# Patient Record
Sex: FEMALE | Race: OTHER | HISPANIC OR LATINO | URBAN - METROPOLITAN AREA
[De-identification: names, ages, dates, MRNs, and addresses within clinical notes are randomized per-mention and may not be internally consistent; named-entity substitution may affect disease eponyms.]

---

## 2020-07-06 ENCOUNTER — OFFICE VISIT (OUTPATIENT)
Dept: URBAN - METROPOLITAN AREA CLINIC 63 | Facility: CLINIC | Age: 85
End: 2020-07-06

## 2020-08-03 ENCOUNTER — OFFICE VISIT (OUTPATIENT)
Dept: URBAN - METROPOLITAN AREA CLINIC 60 | Facility: CLINIC | Age: 85
End: 2020-08-03

## 2022-03-30 ENCOUNTER — OFFICE VISIT (OUTPATIENT)
Dept: URBAN - METROPOLITAN AREA CLINIC 60 | Facility: CLINIC | Age: 87
End: 2022-03-30

## 2022-07-09 ENCOUNTER — TELEPHONE ENCOUNTER (OUTPATIENT)
Dept: URBAN - METROPOLITAN AREA CLINIC 121 | Facility: CLINIC | Age: 87
End: 2022-07-09

## 2022-07-09 RX ORDER — SUCRALFATE 1 G/1
TWICE A DAY TABLET ORAL TWICE A DAY
Refills: 0 | OUTPATIENT
Start: 2017-06-28 | End: 2017-07-12

## 2022-07-09 RX ORDER — IRBESARTAN/HYDROCHLOROTHIAZIDE 150-12.5MG
TABLET ORAL
Refills: 0 | OUTPATIENT
Start: 2018-02-05 | End: 2018-04-16

## 2022-07-09 RX ORDER — SERTRALINE 50 MG/1
TABLET, FILM COATED ORAL
Refills: 0 | OUTPATIENT
Start: 2019-11-21 | End: 2020-07-06

## 2022-07-09 RX ORDER — SUCRALFATE 1 G/1
TWICE A DAY TABLET ORAL TWICE A DAY
Refills: 0 | OUTPATIENT
Start: 2017-04-05 | End: 2017-06-28

## 2022-07-09 RX ORDER — ESOMEPRAZOLE MAGNESIUM 40 MG
ONCE A DAY CAPSULE,DELAYED RELEASE (ENTERIC COATED) ORAL ONCE A DAY
Refills: 1 | OUTPATIENT
Start: 2017-04-05 | End: 2017-06-28

## 2022-07-09 RX ORDER — ESOMEPRAZOLE MAGNESIUM 20 MG/1
ONCE A DAY CAPSULE, DELAYED RELEASE ORAL ONCE A DAY
Refills: 0 | OUTPATIENT
Start: 2019-10-15 | End: 2019-12-30

## 2022-07-09 RX ORDER — ESOMEPRAZOLE MAGNESIUM 40 MG/1
USE AS DIRECTED CAPSULE, DELAYED RELEASE ORAL
Refills: 0 | OUTPATIENT
Start: 2018-11-13 | End: 2018-08-29

## 2022-07-09 RX ORDER — DEXLANSOPRAZOLE 60 MG/1
ONCE A DAY CAPSULE, DELAYED RELEASE ORAL ONCE A DAY
Refills: 1 | OUTPATIENT
Start: 2020-07-06 | End: 2020-10-13

## 2022-07-09 RX ORDER — ESOMEPRAZOLE MAGNESIUM 40 MG/1
USE AS DIRECTED CAPSULE, DELAYED RELEASE ORAL
Refills: 0 | OUTPATIENT
Start: 2019-06-05 | End: 2018-08-29

## 2022-07-09 RX ORDER — ROSUVASTATIN CALCIUM 10 MG
TABLET ORAL
Refills: 0 | OUTPATIENT
Start: 2020-02-03 | End: 2020-07-06

## 2022-07-09 RX ORDER — IRBESARTAN/HYDROCHLOROTHIAZIDE 150-12.5MG
TABLET ORAL
Refills: 0 | OUTPATIENT
Start: 2018-04-16 | End: 2019-08-26

## 2022-07-09 RX ORDER — ROSUVASTATIN CALCIUM 10 MG
TABLET ORAL
Refills: 0 | OUTPATIENT
Start: 2018-02-05 | End: 2018-04-16

## 2022-07-09 RX ORDER — FAMOTIDINE 10 MG
ONCE A DAY TABLET ORAL ONCE A DAY
Refills: 1 | OUTPATIENT
Start: 2018-08-29 | End: 2019-02-18

## 2022-07-09 RX ORDER — ESOMEPRAZOLE MAGNESIUM 40 MG
ONCE A DAY CAPSULE,DELAYED RELEASE (ENTERIC COATED) ORAL ONCE A DAY
Refills: 1 | OUTPATIENT
Start: 2017-01-25 | End: 2017-04-05

## 2022-07-09 RX ORDER — RANOLAZINE 1000 MG/1
2 TAB BID TABLET, FILM COATED, EXTENDED RELEASE ORAL
Refills: 0 | OUTPATIENT
Start: 2018-02-05 | End: 2018-04-16

## 2022-07-09 RX ORDER — ISOSORBIDE MONONITRATE 60 MG/1
TABLET, EXTENDED RELEASE ORAL
Refills: 0 | OUTPATIENT
Start: 2018-08-29 | End: 2019-08-26

## 2022-07-09 RX ORDER — FAMOTIDINE 10 MG
TABLET ORAL
Refills: 0 | OUTPATIENT
Start: 2018-08-29 | End: 2018-08-29

## 2022-07-09 RX ORDER — ESOMEPRAZOLE MAGNESIUM 40 MG
CAPSULE,DELAYED RELEASE (ENTERIC COATED) ORAL
Refills: 0 | OUTPATIENT
Start: 2017-06-28 | End: 2017-06-28

## 2022-07-09 RX ORDER — ISOSORBIDE MONONITRATE 60 MG/1
TABLET, EXTENDED RELEASE ORAL
Refills: 0 | OUTPATIENT
Start: 2020-02-03 | End: 2020-07-06

## 2022-07-09 RX ORDER — ESOMEPRAZOLE MAGNESIUM 40 MG/1
ONCE A DAY CAPSULE, DELAYED RELEASE ORAL ONCE A DAY
Refills: 1 | OUTPATIENT
Start: 2017-06-28 | End: 2017-07-12

## 2022-07-09 RX ORDER — ESOMEPRAZOLE MAGNESIUM 40 MG/1
CAPSULE, DELAYED RELEASE ORAL
Refills: 0 | OUTPATIENT
Start: 2018-02-05 | End: 2018-04-16

## 2022-07-09 RX ORDER — ROSUVASTATIN CALCIUM 10 MG
TABLET ORAL
Refills: 0 | OUTPATIENT
Start: 2017-06-28 | End: 2017-07-12

## 2022-07-09 RX ORDER — RANOLAZINE 1000 MG/1
2 TAB BID TABLET, FILM COATED, EXTENDED RELEASE ORAL
Refills: 0 | OUTPATIENT
Start: 2017-07-12 | End: 2018-02-05

## 2022-07-09 RX ORDER — ISOSORBIDE MONONITRATE 60 MG/1
TABLET, EXTENDED RELEASE ORAL
Refills: 0 | OUTPATIENT
Start: 2018-04-16 | End: 2018-08-29

## 2022-07-09 RX ORDER — IRBESARTAN/HYDROCHLOROTHIAZIDE 150-12.5MG
TABLET ORAL
Refills: 0 | OUTPATIENT
Start: 2019-08-26 | End: 2020-02-03

## 2022-07-09 RX ORDER — ROSUVASTATIN CALCIUM 10 MG
TABLET ORAL
Refills: 0 | OUTPATIENT
Start: 2018-04-16 | End: 2018-08-29

## 2022-07-09 RX ORDER — RANOLAZINE 1000 MG/1
2 TAB BID TABLET, FILM COATED, EXTENDED RELEASE ORAL
Refills: 0 | OUTPATIENT
Start: 2017-01-25 | End: 2017-06-28

## 2022-07-09 RX ORDER — RANOLAZINE 1000 MG/1
2 TAB BID TABLET, FILM COATED, EXTENDED RELEASE ORAL
Refills: 0 | OUTPATIENT
Start: 2017-06-28 | End: 2017-07-12

## 2022-07-09 RX ORDER — ISOSORBIDE DINITRATE 30 MG/1
TABLET ORAL
Refills: 0 | OUTPATIENT
Start: 2017-06-28 | End: 2017-07-12

## 2022-07-09 RX ORDER — FAMOTIDINE 10 MG
TABLET ORAL
Refills: 0 | OUTPATIENT
Start: 2020-02-03 | End: 2020-07-06

## 2022-07-09 RX ORDER — ESOMEPRAZOLE MAGNESIUM 40 MG/1
USE AS DIRECTED CAPSULE, DELAYED RELEASE ORAL
Refills: 1 | OUTPATIENT
Start: 2018-08-29 | End: 2019-08-26

## 2022-07-09 RX ORDER — IRBESARTAN/HYDROCHLOROTHIAZIDE 150-12.5MG
TABLET ORAL
Refills: 0 | OUTPATIENT
Start: 2020-02-03 | End: 2020-07-06

## 2022-07-09 RX ORDER — ESOMEPRAZOLE MAGNESIUM 40 MG/1
CAPSULE, DELAYED RELEASE ORAL
Refills: 0 | OUTPATIENT
Start: 2018-05-23 | End: 2018-08-29

## 2022-07-09 RX ORDER — FAMOTIDINE 10 MG
TABLET ORAL
Refills: 0 | OUTPATIENT
Start: 2019-08-26 | End: 2020-02-03

## 2022-07-09 RX ORDER — SUCRALFATE 1 G/1
TWICE A DAY TABLET ORAL TWICE A DAY
Refills: 0 | OUTPATIENT
Start: 2020-07-06 | End: 2021-01-19

## 2022-07-09 RX ORDER — ESOMEPRAZOLE MAGNESIUM 20 MG/1
ONCE A DAY CAPSULE, DELAYED RELEASE ORAL ONCE A DAY
Refills: 3 | OUTPATIENT
Start: 2019-10-11 | End: 2020-02-03

## 2022-07-09 RX ORDER — ISOSORBIDE MONONITRATE 60 MG/1
TABLET, EXTENDED RELEASE ORAL
Refills: 0 | OUTPATIENT
Start: 2018-02-05 | End: 2018-04-16

## 2022-07-09 RX ORDER — ESOMEPRAZOLE MAGNESIUM 40 MG/1
USE AS DIRECTED CAPSULE, DELAYED RELEASE ORAL
Refills: 1 | OUTPATIENT
Start: 2017-07-12 | End: 2018-02-05

## 2022-07-09 RX ORDER — RANOLAZINE 1000 MG/1
2 TAB BID TABLET, FILM COATED, EXTENDED RELEASE ORAL
Refills: 0 | OUTPATIENT
Start: 2019-08-26 | End: 2020-02-03

## 2022-07-09 RX ORDER — RANOLAZINE 1000 MG/1
2 TAB BID TABLET, FILM COATED, EXTENDED RELEASE ORAL
Refills: 0 | OUTPATIENT
Start: 2020-02-03 | End: 2020-07-06

## 2022-07-09 RX ORDER — IRBESARTAN/HYDROCHLOROTHIAZIDE 150-12.5MG
TABLET ORAL
Refills: 0 | OUTPATIENT
Start: 2017-07-12 | End: 2018-02-05

## 2022-07-09 RX ORDER — DEXLANSOPRAZOLE 60 MG/1
TAKE 1 CAPSULE ONCE DAILY CAPSULE, DELAYED RELEASE ORAL
Refills: 1 | OUTPATIENT
Start: 2020-10-13 | End: 2021-01-28

## 2022-07-09 RX ORDER — FAMOTIDINE 10 MG
ONCE A DAY AT BED TIME TABLET ORAL ONCE A DAY
Refills: 0 | OUTPATIENT
Start: 2017-07-17 | End: 2017-08-14

## 2022-07-09 RX ORDER — ISOSORBIDE DINITRATE 30 MG/1
TABLET ORAL
Refills: 0 | OUTPATIENT
Start: 2017-01-25 | End: 2017-06-28

## 2022-07-09 RX ORDER — IRBESARTAN/HYDROCHLOROTHIAZIDE 150-12.5MG
TABLET ORAL
Refills: 0 | OUTPATIENT
Start: 2017-06-28 | End: 2017-07-12

## 2022-07-09 RX ORDER — RALOXIFENE HCL 60 MG
TABLET ORAL
Refills: 0 | OUTPATIENT
Start: 2009-04-13 | End: 2017-01-25

## 2022-07-09 RX ORDER — ESOMEPRAZOLE MAGNESIUM 40 MG/1
ONCE A DAY CAPSULE, DELAYED RELEASE ORAL ONCE A DAY
Refills: 0 | OUTPATIENT
Start: 2018-02-13 | End: 2018-04-16

## 2022-07-09 RX ORDER — ISOSORBIDE DINITRATE 30 MG/1
TABLET ORAL
Refills: 0 | OUTPATIENT
Start: 2017-07-12 | End: 2018-02-05

## 2022-07-09 RX ORDER — RANOLAZINE 1000 MG/1
2 TAB BID TABLET, FILM COATED, EXTENDED RELEASE ORAL
Refills: 0 | OUTPATIENT
Start: 2018-04-16 | End: 2018-08-29

## 2022-07-09 RX ORDER — IRBESARTAN/HYDROCHLOROTHIAZIDE 150-12.5MG
TABLET ORAL
Refills: 0 | OUTPATIENT
Start: 2009-04-13 | End: 2017-01-25

## 2022-07-09 RX ORDER — ISOSORBIDE MONONITRATE 60 MG/1
TABLET, EXTENDED RELEASE ORAL
Refills: 0 | OUTPATIENT
Start: 2019-08-26 | End: 2020-02-03

## 2022-07-09 RX ORDER — ESOMEPRAZOLE MAGNESIUM 40 MG/1
CAPSULE, DELAYED RELEASE ORAL
Refills: 0 | OUTPATIENT
Start: 2017-07-12 | End: 2017-07-12

## 2022-07-09 RX ORDER — ESOMEPRAZOLE MAGNESIUM 40 MG/1
USE AS DIRECTED CAPSULE, DELAYED RELEASE ORAL
Refills: 0 | OUTPATIENT
Start: 2019-05-07 | End: 2018-08-29

## 2022-07-09 RX ORDER — RANOLAZINE 1000 MG/1
2 TAB BID TABLET, FILM COATED, EXTENDED RELEASE ORAL
Refills: 0 | OUTPATIENT
Start: 2018-08-29 | End: 2019-08-26

## 2022-07-09 RX ORDER — ROSUVASTATIN CALCIUM 10 MG
TABLET ORAL
Refills: 0 | OUTPATIENT
Start: 2019-08-26 | End: 2020-02-03

## 2022-07-09 RX ORDER — ESOMEPRAZOLE MAGNESIUM 40 MG/1
USE AS DIRECTED CAPSULE, DELAYED RELEASE ORAL
Refills: 0 | OUTPATIENT
Start: 2019-06-24 | End: 2019-08-26

## 2022-07-09 RX ORDER — ESOMEPRAZOLE MAGNESIUM 40 MG/1
CAPSULE, DELAYED RELEASE ORAL
Refills: 0 | OUTPATIENT
Start: 2018-04-16 | End: 2020-02-03

## 2022-07-09 RX ORDER — ROSUVASTATIN CALCIUM 10 MG
TABLET ORAL
Refills: 0 | OUTPATIENT
Start: 2018-08-29 | End: 2019-08-26

## 2022-07-09 RX ORDER — RALOXIFENE HYDROCHLORIDE 60 MG/1
TABLET, FILM COATED ORAL
Refills: 0 | OUTPATIENT
Start: 2020-01-04 | End: 2020-07-06

## 2022-07-09 RX ORDER — ROSUVASTATIN CALCIUM 10 MG
TABLET ORAL
Refills: 0 | OUTPATIENT
Start: 2017-07-12 | End: 2018-02-05

## 2022-07-09 RX ORDER — ESOMEPRAZOLE MAGNESIUM 40 MG
CAPSULE,DELAYED RELEASE (ENTERIC COATED) ORAL
Refills: 0 | OUTPATIENT
Start: 2009-04-13 | End: 2017-01-25

## 2022-07-09 RX ORDER — ESOMEPRAZOLE MAGNESIUM 40 MG/1
CAPSULE, DELAYED RELEASE ORAL
Refills: 0 | OUTPATIENT
Start: 2018-05-21 | End: 2018-05-23

## 2022-07-09 RX ORDER — ROSUVASTATIN CALCIUM 10 MG
TABLET ORAL
Refills: 0 | OUTPATIENT
Start: 2017-01-25 | End: 2017-06-28

## 2022-07-10 ENCOUNTER — TELEPHONE ENCOUNTER (OUTPATIENT)
Dept: URBAN - METROPOLITAN AREA CLINIC 121 | Facility: CLINIC | Age: 87
End: 2022-07-10

## 2022-07-10 RX ORDER — RALOXIFENE HCL 60 MG
TABLET ORAL
Refills: 0 | Status: ACTIVE | COMMUNITY
Start: 2017-01-25

## 2022-07-10 RX ORDER — ROSUVASTATIN CALCIUM 10 MG
TABLET ORAL
Refills: 0 | Status: ACTIVE | COMMUNITY
Start: 2020-07-06

## 2022-07-10 RX ORDER — DEXLANSOPRAZOLE 60 MG/1
ONCE A DAY CAPSULE, DELAYED RELEASE ORAL ONCE A DAY
Refills: 1 | Status: ACTIVE | COMMUNITY
Start: 2021-01-28

## 2022-07-10 RX ORDER — SUCRALFATE 1 G/1
TWICE A DAY TABLET ORAL TWICE A DAY
Refills: 0 | Status: ACTIVE | COMMUNITY
Start: 2021-01-19

## 2022-07-10 RX ORDER — RALOXIFENE HYDROCHLORIDE 60 MG/1
TABLET, FILM COATED ORAL
Refills: 0 | Status: ACTIVE | COMMUNITY
Start: 2020-07-06

## 2022-07-10 RX ORDER — FAMOTIDINE 10 MG
TAKE ONE CAPSULE BY MOUTH AT BEDTIME TABLET ORAL
Refills: 0 | Status: ACTIVE | COMMUNITY
Start: 2017-08-14

## 2022-07-10 RX ORDER — IRBESARTAN/HYDROCHLOROTHIAZIDE 150-12.5MG
TABLET ORAL
Refills: 0 | Status: ACTIVE | COMMUNITY
Start: 2020-07-06

## 2022-07-10 RX ORDER — SERTRALINE 50 MG/1
TABLET, FILM COATED ORAL
Refills: 0 | Status: ACTIVE | COMMUNITY
Start: 2020-07-06

## 2022-07-10 RX ORDER — ESOMEPRAZOLE MAGNESIUM 20 MG/1
ONCE A DAY CAPSULE, DELAYED RELEASE ORAL ONCE A DAY
Refills: 3 | Status: ACTIVE | COMMUNITY
Start: 2019-08-26

## 2022-07-10 RX ORDER — FAMOTIDINE 10 MG
ONCE A DAY TABLET ORAL ONCE A DAY
Refills: 1 | Status: ACTIVE | COMMUNITY
Start: 2019-02-18

## 2022-07-10 RX ORDER — RANOLAZINE 1000 MG/1
2 TAB BID TABLET, FILM COATED, EXTENDED RELEASE ORAL
Refills: 0 | Status: ACTIVE | COMMUNITY
Start: 2020-07-06

## 2022-07-10 RX ORDER — ESOMEPRAZOLE MAGNESIUM 20 MG/1
TAKE 1 CAPSULE ONCE DAILY CAPSULE, DELAYED RELEASE ORAL
Refills: 0 | Status: ACTIVE | COMMUNITY
Start: 2019-12-30

## 2022-07-10 RX ORDER — ISOSORBIDE MONONITRATE 60 MG/1
TABLET, EXTENDED RELEASE ORAL
Refills: 0 | Status: ACTIVE | COMMUNITY
Start: 2020-07-06

## 2022-07-10 RX ORDER — IRBESARTAN/HYDROCHLOROTHIAZIDE 150-12.5MG
TABLET ORAL
Refills: 0 | Status: ACTIVE | COMMUNITY
Start: 2017-01-25

## 2022-10-28 ENCOUNTER — OFFICE VISIT (OUTPATIENT)
Dept: URBAN - METROPOLITAN AREA CLINIC 60 | Facility: CLINIC | Age: 87
End: 2022-10-28
Payer: MEDICARE

## 2022-10-28 ENCOUNTER — LAB OUTSIDE AN ENCOUNTER (OUTPATIENT)
Dept: URBAN - METROPOLITAN AREA CLINIC 60 | Facility: CLINIC | Age: 87
End: 2022-10-28

## 2022-10-28 VITALS
DIASTOLIC BLOOD PRESSURE: 68 MMHG | WEIGHT: 133 LBS | SYSTOLIC BLOOD PRESSURE: 122 MMHG | TEMPERATURE: 97.3 F | HEIGHT: 55 IN | BODY MASS INDEX: 30.78 KG/M2 | OXYGEN SATURATION: 98 % | HEART RATE: 71 BPM

## 2022-10-28 DIAGNOSIS — R13.14 PHARYNGOESOPHAGEAL DYSPHAGIA: ICD-10-CM

## 2022-10-28 DIAGNOSIS — K21.9 GASTROESOPHAGEAL REFLUX DISEASE WITHOUT ESOPHAGITIS: ICD-10-CM

## 2022-10-28 DIAGNOSIS — K29.70 GASTRITIS WITHOUT BLEEDING, UNSPECIFIED CHRONICITY, UNSPECIFIED GASTRITIS TYPE: ICD-10-CM

## 2022-10-28 PROBLEM — 4556007: Status: ACTIVE | Noted: 2022-10-28

## 2022-10-28 PROBLEM — 266435005: Status: ACTIVE | Noted: 2022-10-28

## 2022-10-28 PROBLEM — 40739000: Status: ACTIVE | Noted: 2022-10-28

## 2022-10-28 PROCEDURE — 99214 OFFICE O/P EST MOD 30 MIN: CPT | Performed by: NURSE PRACTITIONER

## 2022-10-28 RX ORDER — ROSUVASTATIN CALCIUM 10 MG/1
TAKE ONE TABLET BY MOUTH ONE TIME DAILY TABLET, FILM COATED ORAL
Qty: 90 UNSPECIFIED | Refills: 0 | Status: ACTIVE | COMMUNITY

## 2022-10-28 RX ORDER — LOSARTAN POTASSIUM AND HYDROCHLOROTHIAZIDE 100; 12.5 MG/1; MG/1
TAKE ONE TABLET BY MOUTH EVERY MORNING TABLET, FILM COATED ORAL
Qty: 90 UNSPECIFIED | Refills: 2 | Status: ACTIVE | COMMUNITY

## 2022-10-28 RX ORDER — ESOMEPRAZOLE MAGNESIUM 20 MG/1
1 CAPSULE CAPSULE, DELAYED RELEASE ORAL ONCE A DAY
Qty: 90 CAPSULE | Refills: 0 | OUTPATIENT
Start: 2022-10-28

## 2022-10-28 RX ORDER — SUCRALFATE 1 G/1
1 TABLET ON AN EMPTY STOMACH TABLET ORAL TWICE A DAY
Qty: 180 TABLET | Refills: 0 | OUTPATIENT
Start: 2022-10-28 | End: 2023-01-25

## 2022-10-28 RX ORDER — ERGOCALCIFEROL 1.25 MG/1
TAKE ONE CAPSULE BY MOUTH ONCE A WEEK CAPSULE, LIQUID FILLED ORAL
Qty: 4 UNSPECIFIED | Refills: 0 | Status: ACTIVE | COMMUNITY

## 2022-10-28 RX ORDER — OXYBUTYNIN CHLORIDE 5 MG/1
TAKE ONE TABLET BY MOUTH TWICE A DAY AS NEEDED TABLET ORAL
Qty: 180 UNSPECIFIED | Refills: 3 | Status: ACTIVE | COMMUNITY

## 2022-10-28 NOTE — HPI-HPI
74 y.o. LF  with HTN, dyslipidemia, and hx of duodenal ulcer who was previously followed by Dr. Bañuelos. She describes undergoing a R carotid endartectomy in 12/2009 and was started on Plavix at that time. She noticed a change in bowel habits since starting the Plavix with  her bowel movements returning to baseline after stopping Plavix. She denies any melena, no hematemesis, no hematochezia, no abdominal pain, no GERD, no dysphagia, no odynophagia. She denies any n/v. She has been taking Nexium once a day over the past month. He had an EGD in 2004 by Dr. Bañuelos which showed duodenal ulcer, grade I esophagitis, small HH. Pathology was positive for H. pylori, no celiac sprue. She did get treated for H. pylori.  A colonoscopy on 8/2005 showed small to medium sied internal hemorrhoids, fair prep.   1/17: Patient with h/o gastritis and h/o PUD,  who complains of epigastric pain, heart burning and GERD will start trial with PPI and will follow in 2 months,  Patient does not want any endoscopic evaluation.  4/17; Patient comes feeling well, on PPI .  Will follow as needed basis.  Will add carafate for a trial. Will follow in 3 months.  6/17: Pateitn comes with epigastric pain states does not improve with nexium. Patient refuse EGD. Will start carafate twice a day  will do UGI series, Will follow in one month.  7/17: Patient had UGI series with evidence of hiatal henria and reflux to the mid esophagus, otherwise, stomach and duodenum are unremarkable. Will try to prevent reflux with diet, and habits  that may prevent reflux. Instructions were given to the patient. Pateint did not tolerate sucralfate. Will continue with PPI. I explain the options, even surgery. patient understand and wants to try medical treatment.  2/18: Patient comes for constipation, and episode of fecal incontinence.  Will do referral to colorectal surgery for evaluation. Patient with a lot of stress because of a her  advance dementia. Pateint with epigastric pain. Pateint refuse in the pass endoscopic procedure but if needed patient states is okay to do endoscopic evaluation, first will increase PPI and wait for colo rectal evaluation and will conisder EGD/ colonoscopy.  4/18: pateint was evaluated by Colorectal as per patient she feels better, with improvement of her constipation. Will need a colonoscopy.  Will plan EGD [place] same day. Will ask for cardiac clearance.  8/18: Patient had EGD and colonoscopy with evidence of small hiatal henria, mild gastritis negative for H pylori, benign gastric polyps, Colonoscopy with diverticulosis, internal hemorrhoids and Polyps 10 mm transverse colon adenoma and benign polyps in the cecum, ascending and rectum. (hyperplastics) will plan colonoscopy in 3 to 5 years.  Will plan follow up as needed basis.  8/19  Patient here today in good general state she said is here for her PPI refill. Nexium 20 mg 1 time a day.  Patient education on PPI chronic use was given, patient said she will continue taking nexiun because she is feeling well taking that medication.  Patient had no complaints today.  2/20: Patient comes feels well, on PPI. will adjust treatment will try to decrease dose patient understand, but does need the medication, patient under a lot of stress take care of her  who has advance dementia. Will follow  in 6 months.  7/20:  Patient comes awith abdominal pain in the epigastrium associted with cold liquids, and spicy food. Patient is under stress,  Patient states when she takes dexilant pain resolved, but now taking nexium is not working for her. Denies melena, or emesis. Will try dexilant and short trial of Sucralfate. and may consider EGD if symptoms persist. Patient does not want endoscopic evaluation.  10/22 Patient here today in good general state.  She is here complaining of having symptom of gastritis and reflux.  Symptoms are mainly related with meal intake.  Patient will start on PPI and Carafate and resume diet for gastritis and reflux.  We will do upper GI.

## 2022-10-28 NOTE — PHYSICAL EXAM GASTROINTESTINAL
Abdomen: Soft, nontender, nondistended , no guarding or rigidity , no masses palpable , normal bowel sounds , Liver and Spleen , no hepatomegaly present , no hepatosplenomegaly , liver nontender , spleen not palpable  Asc Procedure Text (F): After obtaining clear surgical margins the patient was sent to an ASC for surgical repair.  The patient understands they will receive post-surgical care and follow-up from the ASC physician.

## 2022-10-31 ENCOUNTER — TELEPHONE ENCOUNTER (OUTPATIENT)
Dept: URBAN - METROPOLITAN AREA CLINIC 60 | Facility: CLINIC | Age: 87
End: 2022-10-31

## 2022-11-03 ENCOUNTER — TELEPHONE ENCOUNTER (OUTPATIENT)
Dept: URBAN - METROPOLITAN AREA CLINIC 60 | Facility: CLINIC | Age: 87
End: 2022-11-03

## 2022-11-23 ENCOUNTER — OFFICE VISIT (OUTPATIENT)
Dept: URBAN - METROPOLITAN AREA CLINIC 60 | Facility: CLINIC | Age: 87
End: 2022-11-23
Payer: MEDICARE

## 2022-11-23 VITALS
WEIGHT: 135.8 LBS | SYSTOLIC BLOOD PRESSURE: 120 MMHG | BODY MASS INDEX: 31.43 KG/M2 | OXYGEN SATURATION: 98 % | DIASTOLIC BLOOD PRESSURE: 60 MMHG | HEART RATE: 68 BPM | HEIGHT: 55 IN | TEMPERATURE: 97.4 F

## 2022-11-23 DIAGNOSIS — K22.4 ESOPHAGEAL DYSMOTILITY: ICD-10-CM

## 2022-11-23 DIAGNOSIS — K29.50 CHRONIC GASTRITIS WITHOUT BLEEDING, UNSPECIFIED GASTRITIS TYPE: ICD-10-CM

## 2022-11-23 DIAGNOSIS — K21.9 GASTROESOPHAGEAL REFLUX DISEASE, UNSPECIFIED WHETHER ESOPHAGITIS PRESENT: ICD-10-CM

## 2022-11-23 PROBLEM — 266434009: Status: ACTIVE | Noted: 2022-11-23

## 2022-11-23 PROBLEM — 235595009: Status: ACTIVE | Noted: 2022-11-23

## 2022-11-23 PROBLEM — 8493009: Status: ACTIVE | Noted: 2022-11-23

## 2022-11-23 PROCEDURE — 99213 OFFICE O/P EST LOW 20 MIN: CPT | Performed by: NURSE PRACTITIONER

## 2022-11-23 RX ORDER — OXYBUTYNIN CHLORIDE 5 MG/1
TAKE ONE TABLET BY MOUTH TWICE A DAY AS NEEDED TABLET ORAL
Qty: 180 UNSPECIFIED | Refills: 3 | Status: ACTIVE | COMMUNITY

## 2022-11-23 RX ORDER — ERGOCALCIFEROL 1.25 MG/1
TAKE ONE CAPSULE BY MOUTH ONCE A WEEK CAPSULE, LIQUID FILLED ORAL
Qty: 4 UNSPECIFIED | Refills: 0 | Status: ACTIVE | COMMUNITY

## 2022-11-23 RX ORDER — ROSUVASTATIN CALCIUM 10 MG/1
TAKE ONE TABLET BY MOUTH ONE TIME DAILY TABLET, FILM COATED ORAL
Qty: 90 UNSPECIFIED | Refills: 0 | Status: ACTIVE | COMMUNITY

## 2022-11-23 RX ORDER — LOSARTAN POTASSIUM AND HYDROCHLOROTHIAZIDE 100; 12.5 MG/1; MG/1
TAKE ONE TABLET BY MOUTH EVERY MORNING TABLET, FILM COATED ORAL
Qty: 90 UNSPECIFIED | Refills: 2 | Status: ACTIVE | COMMUNITY

## 2022-11-23 RX ORDER — ESOMEPRAZOLE MAGNESIUM 20 MG/1
1 CAPSULE CAPSULE, DELAYED RELEASE ORAL ONCE A DAY
Qty: 90 CAPSULE | Refills: 0 | Status: ACTIVE | COMMUNITY
Start: 2022-10-28

## 2022-11-23 RX ORDER — SUCRALFATE 1 G/1
1 TABLET ON AN EMPTY STOMACH TABLET ORAL TWICE A DAY
Qty: 180 TABLET | Refills: 0 | Status: ACTIVE | COMMUNITY
Start: 2022-10-28 | End: 2023-01-25

## 2022-11-23 NOTE — HPI-HPI
74 y.o. LF  with HTN, dyslipidemia, and hx of duodenal ulcer who was previously followed by Dr. Bañuelos. She describes undergoing an R carotid endarterectomy in 12/2009 and was started on Plavix at that time. She noticed a change in bowel habits since starting the Plavix with  her bowel movements returning to baseline after stopping Plavix. She denies any melena, no hematemesis, no hematochezia, no abdominal pain, no GERD, no dysphagia, no odynophagia. She denies any n/v. She has been taking Nexium once a day over the past month. He had an EGD in 2004 by Dr. Bañuelos which showed duodenal ulcer, grade I esophagitis, small HH. Pathology was positive for H. pylori, no celiac sprue. She did get treated for H. pylori.  A colonoscopy on 8/2005 showed small to medium said internal hemorrhoids, fair prep.   1/17: Patient with h/o gastritis and h/o PUD,  who complains of epigastric pain, heart burning and GERD will start trial with PPI and will follow in 2 months,  Patient does not want any endoscopic evaluation.  4/17; Patient comes feeling well, on PPI.  Will follow as needed basis.  Will add Carafate for a trial. Will follow in 3 months.  6/17: Pateint comes with epigastric pain states does not improve with Nexium. Patient refuse EGD. Will start Carafate twice a day  will do UGI series, Will follow in one month.  7/17: Patient had UGI series with evidence of hiatal hernia and reflux to the mid esophagus, otherwise, stomach and duodenum are unremarkable. Will try to prevent reflux with diet, and habits  that may prevent reflux. Instructions were given to the patient. Patient did not tolerate sucralfate. Will continue with PPI. I explain the options, even surgery. Patient understand and wants to try medical treatment.  2/18: Patient comes for constipation, and episode of fecal incontinence.  Will do referral to colorectal surgery for evaluation. Patient with a lot of stress because of her  advance dementia. Patient with epigastric pain. Pateint refuse in the pass endoscopic procedure but if needed patient states is okay to do endoscopic evaluation, first will increase PPI and wait for colorectal evaluation and will consider EGD/ colonoscopy.  4/18: patient was evaluated by Colorectal as per patient she feels better, with improvement of her constipation. Will need a colonoscopy.  Will plan EGD the same day. Will ask for cardiac clearance.  8/18: Patient had EGD and colonoscopy with evidence of small hiatal hernia, mild gastritis negative for H pylori, benign gastric polyps, Colonoscopy with diverticulosis, internal hemorrhoids and Polyps 10 mm transverse colon adenoma and benign polyps in the cecum, ascending and rectum. (hyperplastics) will plan colonoscopy in 3 to 5 years.  Will plan follow up as needed basis.  8/19  Patient here today in good general state she said is here for her PPI refill. Nexium 20 mg 1 time a day.  Patient education on PPI chronic use was given, patient said she will continue taking Nexium because she is feeling well taking that medication.  Patient had no complaints today.  2/20: Patient comes feels well, on PPI. Will adjust treatment will try to decrease dose patient understand, but does need the medication, patient under a lot of stress take care of her  who has advance dementia. Will follow  in 6 months.  7/20:  Patient comes with abdominal pain in the epigastrium associated with cold liquids, and spicy food. Patient is under stress,  Patient states when she takes Dexilant pain resolved, but now taking Nexium is not working for her. Denies melena, or emesis. Will try Dexilant and short trial of Sucralfate. And may consider EGD if symptoms persist. Patient does not want endoscopic evaluation.  10/22 Patient here today in good general state.  She is here complaining of having symptom of gastritis and reflux.  Symptoms are mainly related with meal intake.  Patient will start on PPI and Carafate and resume diet for gastritis and reflux.  We will do upper GI. 11/22 Patient is here today in good general state.  He says symptoms of gastritis and reflux are better.  Upper GI series shows evidence of no evidence for esophageal mucosal abnormalities, dysmotility of the mid and distal esophagus, no abnormalities in the stomach or duodenum.  Patient will continue with diet and treatment for gastritis for 2 more weeks.  Diet for dysphagia was discussed with her.
- - -

## 2022-12-27 ENCOUNTER — OFFICE VISIT (OUTPATIENT)
Dept: URBAN - METROPOLITAN AREA CLINIC 63 | Facility: CLINIC | Age: 87
End: 2022-12-27

## 2023-04-19 ENCOUNTER — OFFICE VISIT (OUTPATIENT)
Dept: URBAN - METROPOLITAN AREA CLINIC 60 | Facility: CLINIC | Age: 88
End: 2023-04-19

## 2023-04-25 ENCOUNTER — DASHBOARD ENCOUNTERS (OUTPATIENT)
Age: 88
End: 2023-04-25

## 2023-04-25 ENCOUNTER — OFFICE VISIT (OUTPATIENT)
Dept: URBAN - METROPOLITAN AREA CLINIC 60 | Facility: CLINIC | Age: 88
End: 2023-04-25
Payer: MEDICARE

## 2023-04-25 VITALS
RESPIRATION RATE: 20 BRPM | HEIGHT: 55 IN | WEIGHT: 133 LBS | DIASTOLIC BLOOD PRESSURE: 78 MMHG | HEART RATE: 71 BPM | BODY MASS INDEX: 30.78 KG/M2 | TEMPERATURE: 97.7 F | SYSTOLIC BLOOD PRESSURE: 120 MMHG | OXYGEN SATURATION: 98 %

## 2023-04-25 DIAGNOSIS — K21.9 GASTROESOPHAGEAL REFLUX DISEASE WITHOUT ESOPHAGITIS: ICD-10-CM

## 2023-04-25 DIAGNOSIS — K29.50 CHRONIC GASTRITIS WITHOUT BLEEDING, UNSPECIFIED GASTRITIS TYPE: ICD-10-CM

## 2023-04-25 PROCEDURE — 99213 OFFICE O/P EST LOW 20 MIN: CPT | Performed by: NURSE PRACTITIONER

## 2023-04-25 RX ORDER — OXYBUTYNIN CHLORIDE 5 MG/1
TAKE ONE TABLET BY MOUTH TWICE A DAY AS NEEDED TABLET ORAL
Qty: 180 UNSPECIFIED | Refills: 3 | Status: ACTIVE | COMMUNITY

## 2023-04-25 RX ORDER — ROSUVASTATIN CALCIUM 10 MG/1
TAKE ONE TABLET BY MOUTH ONE TIME DAILY TABLET, FILM COATED ORAL
Qty: 90 UNSPECIFIED | Refills: 0 | Status: ACTIVE | COMMUNITY

## 2023-04-25 RX ORDER — ESOMEPRAZOLE MAGNESIUM 20 MG/1
1 CAPSULE CAPSULE, DELAYED RELEASE ORAL ONCE A DAY
Qty: 90 CAPSULE | Refills: 0 | OUTPATIENT
Start: 2023-04-25

## 2023-04-25 RX ORDER — LOSARTAN POTASSIUM AND HYDROCHLOROTHIAZIDE 100; 12.5 MG/1; MG/1
TAKE ONE TABLET BY MOUTH EVERY MORNING TABLET, FILM COATED ORAL
Qty: 90 UNSPECIFIED | Refills: 2 | Status: ACTIVE | COMMUNITY

## 2023-04-25 RX ORDER — CHOLECALCIFEROL (VITAMIN D3) 50 MCG
1 TABLET TABLET ORAL ONCE A DAY
Status: ACTIVE | COMMUNITY

## 2023-04-25 NOTE — HPI-HPI
74 y.o. LF  with HTN, dyslipidemia, and hx of duodenal ulcer who was previously followed by Dr. Bañuelos. She describes undergoing an R carotid endarterectomy in 12/2009 and was started on Plavix at that time. She noticed a change in bowel habits since starting the Plavix with  her bowel movements returning to baseline after stopping Plavix. She denies any melena, no hematemesis, no hematochezia, no abdominal pain, no GERD, no dysphagia, no odynophagia. She denies any n/v. She has been taking Nexium once a day over the past month. He had an EGD in 2004 by Dr. Bañuelos which showed duodenal ulcer, grade I esophagitis, small HH. Pathology was positive for H. pylori, no celiac sprue. She did get treated for H. pylori.  A colonoscopy on 8/2005 showed small to medium said internal hemorrhoids, fair prep.   1/17: Patient with h/o gastritis and h/o PUD,  who complains of epigastric pain, heart burning and GERD will start trial with PPI and will follow in 2 months,  Patient does not want any endoscopic evaluation.  4/17; Patient comes feeling well, on PPI.  Will follow as needed basis.  Will add Carafate for a trial. Will follow in 3 months.  6/17: Pateint comes with epigastric pain states does not improve with Nexium. Patient refuse EGD. Will start Carafate twice a day  will do UGI series, Will follow in one month.  7/17: Patient had UGI series with evidence of hiatal hernia and reflux to the mid esophagus, otherwise, stomach and duodenum are unremarkable. Will try to prevent reflux with diet, and habits  that may prevent reflux. Instructions were given to the patient. Patient did not tolerate sucralfate. Will continue with PPI. I explain the options, even surgery. Patient understand and wants to try medical treatment.  2/18: Patient comes for constipation, and episode of fecal incontinence.  Will do referral to colorectal surgery for evaluation. Patient with a lot of stress because of her  advance dementia. Patient with epigastric pain. Pateint refuse in the pass endoscopic procedure but if needed patient states is okay to do endoscopic evaluation, first will increase PPI and wait for colorectal evaluation and will consider EGD/ colonoscopy.  4/18: patient was evaluated by Colorectal as per patient she feels better, with improvement of her constipation. Will need a colonoscopy.  Will plan EGD the same day. Will ask for cardiac clearance.  8/18: Patient had EGD and colonoscopy with evidence of small hiatal hernia, mild gastritis negative for H pylori, benign gastric polyps, Colonoscopy with diverticulosis, internal hemorrhoids and Polyps 10 mm transverse colon adenoma and benign polyps in the cecum, ascending and rectum. (hyperplastics) will plan colonoscopy in 3 to 5 years.  Will plan follow up as needed basis.  8/19  Patient here today in good general state she said is here for her PPI refill. Nexium 20 mg 1 time a day.  Patient education on PPI chronic use was given, patient said she will continue taking Nexium because she is feeling well taking that medication.  Patient had no complaints today.  2/20: Patient comes feels well, on PPI. Will adjust treatment will try to decrease dose patient understand, but does need the medication, patient under a lot of stress take care of her  who has advance dementia. Will follow  in 6 months.  7/20:  Patient comes with abdominal pain in the epigastrium associated with cold liquids, and spicy food. Patient is under stress,  Patient states when she takes Dexilant pain resolved, but now taking Nexium is not working for her. Denies melena, or emesis. Will try Dexilant and short trial of Sucralfate. And may consider EGD if symptoms persist. Patient does not want endoscopic evaluation.  10/22 Patient here today in good general state.  She is here complaining of having symptom of gastritis and reflux.  Symptoms are mainly related with meal intake.  Patient will start on PPI and Carafate and resume diet for gastritis and reflux.  We will do upper GI. 11/22 Patient is here today in good general state.  He says symptoms of gastritis and reflux are better.  Upper GI series shows evidence of no evidence for esophageal mucosal abnormalities, dysmotility of the mid and distal esophagus, no abnormalities in the stomach or duodenum.  Patient will continue with diet and treatment for gastritis for 2 more weeks.  Diet for dysphagia was discussed with her. 4/23 patient is here today complaining of some symptom of gastritis and acid reflux.  Her symptoms are chronic patient is not doing diet.  Also has very poor fluid intake. She will resume diet and treatment with Nexium 20 mg once a day for 4-week.

## 2023-07-09 NOTE — PHYSICAL EXAM HENT:
Head,  normocephalic,  atraumatic,  Face,  Face within normal limits,  Ears,  External ears within normal limits,  Nose/Nasopharynx,  External nose  normal appearance,  no nasal discharge,  Mouth and Throat,  Oral cavity appearance normal. Mucosa normal. Lips,  Appearance normal 
09-Jul-2023 08:00